# Patient Record
Sex: FEMALE | Race: WHITE | NOT HISPANIC OR LATINO | Employment: OTHER | URBAN - METROPOLITAN AREA
[De-identification: names, ages, dates, MRNs, and addresses within clinical notes are randomized per-mention and may not be internally consistent; named-entity substitution may affect disease eponyms.]

---

## 2023-12-29 PROBLEM — R00.1 SINUS BRADYCARDIA: Status: ACTIVE | Noted: 2023-12-29

## 2023-12-29 PROBLEM — N18.30 CHRONIC KIDNEY DISEASE, STAGE 3 (MULTI): Status: ACTIVE | Noted: 2023-12-29

## 2023-12-29 PROBLEM — I10 HYPERTENSION: Status: ACTIVE | Noted: 2023-12-29

## 2023-12-29 PROBLEM — E03.9 HYPOTHYROIDISM: Status: ACTIVE | Noted: 2023-12-29

## 2023-12-29 PROBLEM — I48.92 ATRIAL FLUTTER (MULTI): Status: ACTIVE | Noted: 2023-12-29

## 2023-12-29 PROBLEM — I47.19 PAT (PAROXYSMAL ATRIAL TACHYCARDIA) (CMS-HCC): Status: ACTIVE | Noted: 2023-12-29

## 2023-12-29 PROBLEM — E78.2 MIXED HYPERLIPIDEMIA: Status: ACTIVE | Noted: 2023-12-29

## 2023-12-29 PROBLEM — I49.3 PREMATURE VENTRICULAR CONTRACTIONS: Status: ACTIVE | Noted: 2023-12-29

## 2023-12-29 PROBLEM — I63.9 CEREBROVASCULAR ACCIDENT (CVA) (MULTI): Status: ACTIVE | Noted: 2023-12-29

## 2023-12-29 RX ORDER — LANOLIN ALCOHOL/MO/W.PET/CERES
400 CREAM (GRAM) TOPICAL DAILY
COMMUNITY

## 2023-12-29 RX ORDER — LOVASTATIN 40 MG/1
40 TABLET ORAL NIGHTLY
COMMUNITY
Start: 2021-11-16

## 2023-12-29 RX ORDER — CARVEDILOL 6.25 MG/1
6.25 TABLET ORAL
COMMUNITY
Start: 2022-08-14 | End: 2024-03-08 | Stop reason: WASHOUT

## 2023-12-29 RX ORDER — MULTIVITAMIN
1 TABLET ORAL DAILY
COMMUNITY

## 2023-12-29 RX ORDER — GABAPENTIN 300 MG/1
300 CAPSULE ORAL NIGHTLY
COMMUNITY
Start: 2021-11-16

## 2023-12-29 RX ORDER — DOCUSATE SODIUM 100 MG/1
100 CAPSULE, LIQUID FILLED ORAL 2 TIMES DAILY PRN
COMMUNITY

## 2023-12-29 RX ORDER — ASPIRIN 81 MG/1
81 TABLET ORAL DAILY
COMMUNITY

## 2023-12-29 RX ORDER — LEVOTHYROXINE SODIUM 25 UG/1
25 TABLET ORAL DAILY
COMMUNITY
Start: 2021-11-16

## 2023-12-29 RX ORDER — ALLOPURINOL 100 MG/1
1 TABLET ORAL 2 TIMES DAILY
COMMUNITY
Start: 2021-11-29

## 2023-12-29 RX ORDER — LOSARTAN POTASSIUM 100 MG/1
100 TABLET ORAL DAILY
COMMUNITY
Start: 2023-01-04

## 2023-12-29 RX ORDER — CHOLECALCIFEROL (VITAMIN D3) 25 MCG
1 TABLET ORAL DAILY
COMMUNITY

## 2023-12-29 RX ORDER — LUTEIN 6 MG
1 TABLET ORAL DAILY
COMMUNITY

## 2024-03-08 ENCOUNTER — OFFICE VISIT (OUTPATIENT)
Dept: CARDIOLOGY | Facility: CLINIC | Age: 89
End: 2024-03-08
Payer: MEDICARE

## 2024-03-08 VITALS
BODY MASS INDEX: 29.64 KG/M2 | HEART RATE: 63 BPM | WEIGHT: 151 LBS | SYSTOLIC BLOOD PRESSURE: 134 MMHG | DIASTOLIC BLOOD PRESSURE: 68 MMHG | HEIGHT: 60 IN

## 2024-03-08 DIAGNOSIS — I21.09: Primary | ICD-10-CM

## 2024-03-08 DIAGNOSIS — E78.2 MIXED HYPERLIPIDEMIA: ICD-10-CM

## 2024-03-08 DIAGNOSIS — I10 PRIMARY HYPERTENSION: ICD-10-CM

## 2024-03-08 DIAGNOSIS — I48.92 ATRIAL FLUTTER, UNSPECIFIED TYPE (MULTI): ICD-10-CM

## 2024-03-08 DIAGNOSIS — R00.1 SINUS BRADYCARDIA: ICD-10-CM

## 2024-03-08 DIAGNOSIS — R53.83 OTHER FATIGUE: ICD-10-CM

## 2024-03-08 DIAGNOSIS — I63.9 CEREBROVASCULAR ACCIDENT (CVA), UNSPECIFIED MECHANISM (MULTI): ICD-10-CM

## 2024-03-08 DIAGNOSIS — I47.19 PAT (PAROXYSMAL ATRIAL TACHYCARDIA) (CMS-HCC): ICD-10-CM

## 2024-03-08 DIAGNOSIS — Z78.9 NEVER SMOKED CIGARETTES: ICD-10-CM

## 2024-03-08 DIAGNOSIS — N18.30 STAGE 3 CHRONIC KIDNEY DISEASE, UNSPECIFIED WHETHER STAGE 3A OR 3B CKD (MULTI): ICD-10-CM

## 2024-03-08 DIAGNOSIS — Z71.89 ENCOUNTER TO DISCUSS TREATMENT OPTIONS: ICD-10-CM

## 2024-03-08 DIAGNOSIS — E03.9 HYPOTHYROIDISM, UNSPECIFIED TYPE: ICD-10-CM

## 2024-03-08 DIAGNOSIS — I49.3 PREMATURE VENTRICULAR CONTRACTIONS: ICD-10-CM

## 2024-03-08 DIAGNOSIS — Z71.89 ENCOUNTER FOR MEDICATION REVIEW AND COUNSELING: ICD-10-CM

## 2024-03-08 PROCEDURE — 1036F TOBACCO NON-USER: CPT | Performed by: INTERNAL MEDICINE

## 2024-03-08 PROCEDURE — 3075F SYST BP GE 130 - 139MM HG: CPT | Performed by: INTERNAL MEDICINE

## 2024-03-08 PROCEDURE — 3078F DIAST BP <80 MM HG: CPT | Performed by: INTERNAL MEDICINE

## 2024-03-08 PROCEDURE — 1159F MED LIST DOCD IN RCRD: CPT | Performed by: INTERNAL MEDICINE

## 2024-03-08 PROCEDURE — 99215 OFFICE O/P EST HI 40 MIN: CPT | Performed by: INTERNAL MEDICINE

## 2024-03-08 RX ORDER — HYDROCHLOROTHIAZIDE 12.5 MG/1
12.5 CAPSULE ORAL DAILY
COMMUNITY

## 2024-03-08 RX ORDER — NITROGLYCERIN 0.4 MG/1
0.4 TABLET SUBLINGUAL EVERY 5 MIN PRN
Qty: 20 TABLET | Refills: 2 | Status: SHIPPED | OUTPATIENT
Start: 2024-03-08 | End: 2024-05-22

## 2024-03-08 RX ORDER — CARVEDILOL 12.5 MG/1
12.5 TABLET ORAL
COMMUNITY

## 2024-03-08 RX ORDER — HYDRALAZINE HYDROCHLORIDE 25 MG/1
25 TABLET, FILM COATED ORAL 2 TIMES DAILY
COMMUNITY

## 2024-03-08 RX ORDER — ISOSORBIDE MONONITRATE 30 MG/1
30 TABLET, EXTENDED RELEASE ORAL DAILY
Qty: 90 TABLET | Refills: 3 | Status: SHIPPED | OUTPATIENT
Start: 2024-03-08 | End: 2025-03-08

## 2024-03-08 ASSESSMENT — ENCOUNTER SYMPTOMS
LIGHT-HEADEDNESS: 1
DYSPNEA ON EXERTION: 1
PALPITATIONS: 0

## 2024-03-08 NOTE — PROGRESS NOTES
Chief Complaint:   Follow-up (1 year)     History Of Present Illness:    Christina Kingsley is a 89 y.o. female presenting with follow-up.       She feels okay.  She denies any potation, lightheadedness, near-syncope, or syncope.    She has noticed when she is up  stairs she is pretty short of breath and has chest tightness.  We discussed stress testing and possible catheterization.  She reports that she wants conservative therapy with medications.      Last Recorded Vitals:  Vitals:    03/08/24 1037   BP: 134/68   BP Location: Left arm   Patient Position: Sitting   Pulse: 63   Weight: 68.5 kg (151 lb)   Height: 1.524 m (5')       Past Medical History:  See List    Past Surgical History:  See List       Social History:  She reports that she has never smoked. She has never used smokeless tobacco. She reports that she does not currently use alcohol. She reports that she does not use drugs.    Family History:  Family History   Problem Relation Name Age of Onset    Stroke Mother      Hypertension Mother      Cancer Mother      Stroke Father          Allergies:  Patient has no known allergies.    Outpatient Medications:  Current Outpatient Medications   Medication Instructions    allopurinol (Zyloprim) 100 mg tablet 1 tablet, oral, 2 times daily    aspirin 81 mg, oral, Daily    CALCIUM CARBONATE-VITAMIN D3 ORAL 1 tablet, oral, Daily    carvedilol (COREG) 12.5 mg, oral, 2 times daily with meals    cholecalciferol (Vitamin D-3) 25 MCG (1000 UT) tablet 1 tablet, oral, Daily    docusate sodium (COLACE) 100 mg, oral, 2 times daily PRN    gabapentin (NEURONTIN) 300 mg, oral, Nightly    hydrALAZINE (APRESOLINE) 25 mg, oral, 2 times daily    hydroCHLOROthiazide (MICROZIDE) 12.5 mg, oral, Daily    levothyroxine (SYNTHROID, LEVOXYL) 25 mcg, oral, Daily    losartan (COZAAR) 100 mg, oral, Daily    lovastatin (MEVACOR) 40 mg, oral, Nightly    magnesium oxide (MAG-OX) 400 mg, oral, Daily    multivitamin tablet 1 tablet, oral, Daily     vitamin E acid succinate (vitamin E succinate) 268 mg (400 unit) tablet 1 tablet, oral, Daily   Review of Systems   Cardiovascular:  Positive for dyspnea on exertion. Negative for chest pain and palpitations.   Neurological:  Positive for light-headedness.         Physical Exam:  Constitutional:       General: Awake.      Appearance: Normal and healthy appearance. Well-developed and not in distress.   Neck:      Vascular: No JVR. JVD normal.   Pulmonary:      Effort: Pulmonary effort is normal.      Breath sounds: Normal breath sounds. No wheezing. No rhonchi. No rales.   Chest:      Chest wall: Not tender to palpatation.   Cardiovascular:      PMI at left midclavicular line. Normal rate. Regular rhythm. Normal S1. Normal S2.       Murmurs: There is no murmur.      No gallop.  No click. No rub.   Pulses:     Intact distal pulses.   Edema:     Peripheral edema absent.   Abdominal:      Tenderness: There is no abdominal tenderness.   Musculoskeletal: Normal range of motion.         General: No tenderness. Skin:     General: Skin is warm and dry.   Neurological:      General: No focal deficit present.      Mental Status: Alert and oriented to person, place and time.            Last Labs:  CBC -  Lab Results   Component Value Date    WBC 7.2 02/09/2022    HGB 12.4 02/09/2022    HCT 35.3 (L) 02/09/2022     (H) 02/09/2022     02/09/2022       CMP -  Lab Results   Component Value Date    CALCIUM 9.4 02/09/2022    PHOS 4.6 02/09/2022    PROT 6.6 02/09/2022    ALBUMIN 3.7 02/09/2022    AST 18 02/09/2022    ALT 13 02/09/2022    ALKPHOS 57 02/09/2022    BILITOT 0.5 02/09/2022       LIPID PANEL -   Lab Results   Component Value Date    CHOL 136 02/09/2022    TRIG 90 02/09/2022    HDL 52.0 02/09/2022    CHHDL 2.6 02/09/2022    LDLF 66 02/09/2022    VLDL 18 02/09/2022       RENAL FUNCTION PANEL -   Lab Results   Component Value Date    GLUCOSE 89 02/09/2022     02/09/2022    K 3.9 02/09/2022      "02/09/2022    CO2 30 02/09/2022    ANIONGAP 11 02/09/2022    BUN 29 (H) 02/09/2022    CREATININE 1.16 (H) 02/09/2022    CALCIUM 9.4 02/09/2022    PHOS 4.6 02/09/2022    ALBUMIN 3.7 02/09/2022        Lab Results   Component Value Date    BNP 71 06/26/2019       Last Cardiology Tests:  ECG:  Today.  Normal sinus rhythm.  Normal axis.  QT interval 410 ms.  Previous anteroseptal infarct.    Lab review: I have personally reviewed the laboratory result(s) see above    Assessment/Plan   Diagnoses and all orders for this visit:  Atrial flutter, unspecified type (CMS/HCC)  PAT (paroxysmal atrial tachycardia)  Premature ventricular contractions  Sinus bradycardia  Cerebrovascular accident (CVA), unspecified mechanism (CMS/HCC)  Mixed hyperlipidemia  Hypothyroidism, unspecified type  Primary hypertension  Other fatigue  Stage 3 chronic kidney disease, unspecified whether stage 3a or 3b CKD (CMS/HCC)  Never smoked cigarettes  BMI 29.0-29.9,adult  Encounter for medication review and counseling  Encounter to discuss treatment options    Exertional dyspnea and angina.  Patient request conservative therapy.  Will start Ismo 30 mg daily.  Also add nitroglycerin sublingual as needed.  Prescriptions sent.  Declines any evaluation.  She reports that given her age, she does not want to have really \"anything done.\"  Orthostatic near syncope and dizziness. Chronic. Improved after adjustment of medications in the past.  Weight has been stable, she will take that medication after lunch to minimize dizziness.  Behavioral modification reinforced.  Frequent premature ventricular contractions Holter 2017. Chronic. Stable. Reviewed medications. Continue medications.  Discussed refills.  Normal left ventricular function echo 2017. Left atrial enlargement with left atrial size of 48 mm . Chronic.  History of sinus bradycardia. No symptoms with Holter monitor June 2022.  Chronic.  Stable.  Persistent atrial flutter and atrial tachycardia.  Declines " any further intervention.  Valvular heart disease with mild-to-moderate mitral regurgitation, moderate-to-severe tricuspid regurgitation, echo 2017.  Declines repeat evaluation.  Pulmonary hypertension with RVSP of 48 mmHg . Chronic. Stable. Declining continues to decline any further evaluation given her age.  Hyperlipidemia. Reviewed medications.  Hypertension, controlled, benign, chronic. Reviewed medications. Continue medications. Refill discussed  Chronic kidney disease, stage 3. Stable. Follows with renal.  Overweight        AHA recommendations for exercise, diet, and behavioral modification reviewed with pt.     The patient and I discussed the mechanism of arrhythmia, ECG, angina, shortness of breath, blood pressure, last blood work, ECG, heart rate, serial treatment as per her wishes, no intervention, no cardiac testing, discussion if and what medication refills needed, treatment options, risks, benefits, and imponderables. American Heart Association lifestyle changes and behavioral modification discussed. All questions answered in detail. Counseling over 50% visit regarding above. Patient appreciative of care.       Jeanine Proctor MD

## 2024-03-08 NOTE — PATIENT INSTRUCTIONS
Continue same medications/treatment.  Patient educated on proper medication use.  Patient educated on risk factor modification.  Please bring any lab results from other providers/physicians to your next appointment.    Please bring all medicines, vitamins, and herbal supplements with you when you come to the office.    Prescriptions will not be filled unless you are compliant with your follow up appointments or have a follow up appointment scheduled as per instruction of your physician. Refills should be requested at the time of your visit.    START isosorbide mononitrate 30mg daily  START nitroglycerin tablet as needed for chest pain every 5 min  Follow up with Bernarda in 6 months    IGOR HUYNH RN, AM SCRIBING FOR AND IN THE PRESENCE OF DR. LOUIE KERR MD, FACC, FACP, FHRS

## 2024-05-22 DIAGNOSIS — I21.09: ICD-10-CM

## 2024-05-22 RX ORDER — NITROGLYCERIN 0.4 MG/1
TABLET SUBLINGUAL
Qty: 25 TABLET | Refills: 2 | Status: SHIPPED | OUTPATIENT
Start: 2024-05-22

## 2024-09-09 ENCOUNTER — APPOINTMENT (OUTPATIENT)
Dept: CARDIOLOGY | Facility: CLINIC | Age: 89
End: 2024-09-09
Payer: MEDICARE

## 2024-09-09 VITALS
HEART RATE: 50 BPM | SYSTOLIC BLOOD PRESSURE: 106 MMHG | WEIGHT: 151 LBS | DIASTOLIC BLOOD PRESSURE: 60 MMHG | HEIGHT: 60 IN | BODY MASS INDEX: 29.64 KG/M2

## 2024-09-09 DIAGNOSIS — I10 PRIMARY HYPERTENSION: ICD-10-CM

## 2024-09-09 DIAGNOSIS — I49.3 PREMATURE VENTRICULAR CONTRACTIONS: Primary | ICD-10-CM

## 2024-09-09 DIAGNOSIS — E78.2 MIXED HYPERLIPIDEMIA: ICD-10-CM

## 2024-09-09 DIAGNOSIS — I47.19 PAT (PAROXYSMAL ATRIAL TACHYCARDIA) (CMS-HCC): ICD-10-CM

## 2024-09-09 DIAGNOSIS — I63.9 CEREBROVASCULAR ACCIDENT (CVA), UNSPECIFIED MECHANISM (MULTI): ICD-10-CM

## 2024-09-09 DIAGNOSIS — E03.9 HYPOTHYROIDISM, UNSPECIFIED TYPE: ICD-10-CM

## 2024-09-09 DIAGNOSIS — N18.30 STAGE 3 CHRONIC KIDNEY DISEASE, UNSPECIFIED WHETHER STAGE 3A OR 3B CKD (MULTI): ICD-10-CM

## 2024-09-09 DIAGNOSIS — I48.92 ATRIAL FLUTTER, UNSPECIFIED TYPE (MULTI): ICD-10-CM

## 2024-09-09 DIAGNOSIS — Z78.9 NEVER SMOKED CIGARETTES: ICD-10-CM

## 2024-09-09 DIAGNOSIS — R53.83 OTHER FATIGUE: ICD-10-CM

## 2024-09-09 DIAGNOSIS — R00.1 SINUS BRADYCARDIA: ICD-10-CM

## 2024-09-09 PROCEDURE — 1036F TOBACCO NON-USER: CPT | Performed by: NURSE PRACTITIONER

## 2024-09-09 PROCEDURE — 3078F DIAST BP <80 MM HG: CPT | Performed by: NURSE PRACTITIONER

## 2024-09-09 PROCEDURE — 1159F MED LIST DOCD IN RCRD: CPT | Performed by: NURSE PRACTITIONER

## 2024-09-09 PROCEDURE — 99213 OFFICE O/P EST LOW 20 MIN: CPT | Performed by: NURSE PRACTITIONER

## 2024-09-09 PROCEDURE — 93000 ELECTROCARDIOGRAM COMPLETE: CPT | Performed by: INTERNAL MEDICINE

## 2024-09-09 PROCEDURE — 3074F SYST BP LT 130 MM HG: CPT | Performed by: NURSE PRACTITIONER

## 2024-09-09 NOTE — PROGRESS NOTES
"CARDIOLOGY OFFICE VISIT      CHIEF COMPLAINT  Chief Complaint   Patient presents with    Follow-up     6 months   Chief complaint: \"I do get short of breath with walking.\"    HISTORY OF PRESENT ILLNESS  HPI  History: The patient is a 89-year-old  female who is followed for valvular heart disease consisting of mild to moderate MR, moderate to severe TR, palpitations, orthostatic dizziness and lightheadedness, hypertension, remote CVA and paroxysmal atrial flutter and atrial tachycardia controlled on carvedilol and not anticoagulated.  She states that she does experience some shortness of breath with walking and reports that she is not as dizzy as she usually is.  She also experiences some mild lower extremity swelling which is improved with elevating the extremities.  She denies chest pain, palpitations, abdominal distention, or orthopnea.  Past Medical History  Past Medical History:   Diagnosis Date    Chronic kidney disease, stage 5 (Multi) 11/16/2021    Chronic kidney disease, stage 5    Overweight 11/16/2021    Overweight    Unspecified fall, initial encounter 11/16/2021    Fall at home       Social History  Social History     Tobacco Use    Smoking status: Never    Smokeless tobacco: Never   Substance Use Topics    Alcohol use: Not Currently    Drug use: Never       Family History     Family History   Problem Relation Name Age of Onset    Stroke Mother      Hypertension Mother      Cancer Mother      Stroke Father          Allergies:  No Known Allergies     Outpatient Medications:  Current Outpatient Medications   Medication Instructions    allopurinol (Zyloprim) 100 mg tablet 1 tablet, oral, 2 times daily    aspirin 81 mg, oral, Daily    CALCIUM CARBONATE-VITAMIN D3 ORAL 1 tablet, oral, Daily    carvedilol (COREG) 12.5 mg, oral, 2 times daily (morning and late afternoon)    cholecalciferol (Vitamin D-3) 25 MCG (1000 UT) tablet 1 tablet, oral, Daily    docusate sodium (COLACE) 100 mg, oral, 2 times " daily PRN    gabapentin (NEURONTIN) 300 mg, oral, Nightly    hydrALAZINE (APRESOLINE) 25 mg, oral, 2 times daily    hydroCHLOROthiazide (MICROZIDE) 12.5 mg, oral, Daily    isosorbide mononitrate ER (IMDUR) 30 mg, oral, Daily, Do not crush or chew.    levothyroxine (SYNTHROID, LEVOXYL) 25 mcg, oral, Daily    losartan (COZAAR) 100 mg, oral, Daily    lovastatin (MEVACOR) 40 mg, oral, Nightly    magnesium oxide (MAG-OX) 400 mg, oral, Daily    multivitamin tablet 1 tablet, oral, Daily    nitroglycerin (Nitrostat) 0.4 mg SL tablet PLACE 1 TABLET UNDER TONGUE EVERY 5 MINUTES IF NEEDED FOR CHEST PAIN. MAY REPEAT DOSE EVERY 5 MINUTES FOR UP TO 3 DOSES    vitamin E acid succinate (vitamin E succinate) 268 mg (400 unit) tablet 1 tablet, oral, Daily          REVIEW OF SYSTEMS  Review of Systems   All other systems reviewed and are negative.        VITALS  Vitals:    09/09/24 1019   Pulse: 50       PHYSICAL EXAM  Vitals and nursing note reviewed.   Constitutional:       Appearance: Normal appearance.   HENT:      Head: Normocephalic.   Neck:      Vascular: No JVD. Carotid upstrokes II/IV.  Cardiovascular:      Rate and Rhythm: Normal rate and regular rhythm.      Pulses: Normal pulses.      Heart sounds: Normal S1 S2, no S3 S4.  Grade 2/6 systolic murmur is noted across precordium.    Pulmonary:      Effort: Pulmonary effort is normal. Respirations regular and nonlabored.     Breath sounds: Clear to auscultation posterior laterally.  Abdominal:      General: Bowel sounds are normal.      Palpations: Abdomen is soft.   Musculoskeletal:         General: Normal range of motion.      Cervical back: Normal range of motion.   Skin:     General: Skin is warm and dry.   Neurological:      General: No focal deficit present.      Mental Status: Alert and oriented to person, place, and time.      Motor: Motor function is intact.   Psychiatric:         Attention and Perception: Attention and perception normal.         Mood and Affect: Mood  and affect normal.         Speech: Speech normal.         Behavior: Behavior normal. Behavior is cooperative.         Thought Content: Thought content normal.         Cognition and Memory: Cognition and memory normal.     Labs and testing: Twelve-lead EKG reveals sinus bradycardia 50 bpm.  QRS duration 72 ms,  ms, QTc 373 ms.  No acute ischemic changes are noted.      ASSESSMENT AND PLAN    Clinical impressions:  1. Orthostatic dizziness and lightheadedness.  2. 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  3. Normal left ventricular function per 2D echocardiogram dated September 22, 2017.  4. Left atrial enlargement with left atrial size of 48 mm per 2D echocardiogram.  5. Valvular heart disease consisting of mild to moderate mitral regurgitation, moderate to severe tricuspid regurgitation per 2D echocardiogram.  6. Pulmonary hypertension with right ventricular systolic pressure 48 mmHg per 2D echocardiogram.  7. Hypertension, controlled with a blood pressure today 106/60.  8. Chronic kidney disease, stage III.  9. Class I obesity with a BMI of 29.49.     Recommendations:  1.  Continue current medications as prescribed.  2.  Follow-up in office with Dr. Proctor in 6 months or sooner if needed.  3.  Follow-up in office with Dr. Spencer as scheduled for stage III chronic kidney disease.  4.  Continue lifestyle modifications as discussed.    Evaluation and note by Bernarda Barajas CNP  **Please excuse any errors in grammar or translation related to this dictation.  Voice recognition software was utilized to prepare this document.**

## 2025-01-07 DIAGNOSIS — I21.09: ICD-10-CM

## 2025-01-07 RX ORDER — ISOSORBIDE MONONITRATE 30 MG/1
30 TABLET, EXTENDED RELEASE ORAL DAILY
Qty: 90 TABLET | Refills: 3 | Status: SHIPPED | OUTPATIENT
Start: 2025-01-07

## 2025-01-07 NOTE — TELEPHONE ENCOUNTER
Received request for prescription refills for patient.   Patient follows with Dr Proctor    Request is for Isosorbide  Is patient currently on medication Y    Last OV 9/9/24  Next OV 3/25/25    Pended for signing and sent to provider

## 2025-03-03 ENCOUNTER — TELEPHONE (OUTPATIENT)
Dept: CARDIOLOGY | Facility: CLINIC | Age: OVER 89
End: 2025-03-03
Payer: MEDICARE

## 2025-03-03 NOTE — TELEPHONE ENCOUNTER
Spoke to patient regarding rescheduling appointment with Beverly on 03/25 due to a schedule change. We moved the appointment to be on 04/08 at 1030am.

## 2025-03-25 ENCOUNTER — APPOINTMENT (OUTPATIENT)
Dept: CARDIOLOGY | Facility: CLINIC | Age: OVER 89
End: 2025-03-25
Payer: MEDICARE

## 2025-04-07 NOTE — PROGRESS NOTES
Electrophysiology Office Visit     CHIEF COMPLAINT  Chief Complaint   Patient presents with    Follow-up     6 month      Primary EP doctor: Dr Proctor  Primary Cardiologist:    EP History: Orthostatic dizziness and lightheadedness (behavioral modifications), PVCs, mild-mod MR/mod-severe TR/mod pulm HTN (9/2016 echo), HTN, and remote CVA, CKD 3b, hypothyroidism.  5/2017 EP OV new patient for presyncope. Abnl KOH monitor in Feb 2017; Sinus rhythm, 28 to 94 beats per minute, frequent PACs, 2.66 pauses. Metoprolol was discontinued.  A 24-hour Holter monitor in April 2017 noted, sinus rhythm, heart rate is 48 to 122 beats per minute, one brief episode of atrial tachycardia x4 beats.  11/2017 EP OV, pt c/o orthostatic dizziness. Oct 2017 Holter noted frequent PVCs at 10%, nsVT x 4 beats buy symptoms of dizziness did not correlate with randal or tachy episodes. Recommend increase fluids and caution with positional changes.  Pt would like conservative approach.   6/2018 Started MgOx for PVCs  3/8/2024 EP OV, pt c/o exertional dyspnea and angina but wants conservative tx and no interventions at her age. Imdur 30mg prescribed.   Carvedilol 12.5mg BID, MgOx 400mg QD. Losartan 100mg, Imdur 30mg QD, HCTZ 12.5mg QD, hydralazine 25mg BID    HPI: 4/8/2025  89 year old female pmhx Orthostatic dizziness and lightheadedness (behavioral modifications), PVCs, mild-mod MR/mod-severe TR/mod pulm HTN (9/2016 echo), HTN, and remote CVA, CKD 3b. Here for 6 month follow up.  She resides at the assisted living [facility]. She uses a motorized wheelchair and walker to get around. Since Dr Proctor prescribed Imur, she thinks this helps her light headedness and dizziness. She feels stable with her shortness of breath as she can still do the minimal activity that is required to keep her happy. Never used nitrogylcerin SL. No other concerns.     Today's EKG Interpretation: NSR, rate 72bpm, 1st AVB , OH 206ms, QRS 86ms, Qtc 420ms    VITALS  Vitals:     04/08/25 1024   BP: 132/70   Pulse: 72     Wt Readings from Last 4 Encounters:   04/08/25 65.8 kg (145 lb)   09/09/24 68.5 kg (151 lb)   03/08/24 68.5 kg (151 lb)   03/07/23 69.4 kg (153 lb)     PHYSICAL EXAM:  GENERAL:  Well developed, well nourished, in no acute distress in motorized wheelchair  NEURO/PSYCH:  No focal deficits. A&O x 3   LUNGS:  Clear to auscultation bilaterally. No wheezing, rhonci, or crackles  HEART:  RRR, no M/R/G.   EXTREMITIES:  Warm with good color, no clubbing or cyanosis.  No pitting edema.     Past Medical History:   Diagnosis Date    Chronic kidney disease, stage 5 (Multi) 11/16/2021    Chronic kidney disease, stage 5    Overweight 11/16/2021    Overweight    Unspecified fall, initial encounter 11/16/2021    Fall at home     Past Surgical History:   Procedure Laterality Date    MR HEAD ANGIO WO IV CONTRAST  2/27/2012    MR HEAD ANGIO WO IV CONTRAST [facility]    MR NECK ANGIO WO IV CONTRAST  2/27/2012    MR NECK ANGIO WO IV CONTRAST [facility]    OTHER SURGICAL HISTORY  11/16/2021    Cholecystectomy    OTHER SURGICAL HISTORY  11/16/2021    Cataract surgery    OTHER SURGICAL HISTORY  11/16/2021    Dilation and curettage    OTHER SURGICAL HISTORY  11/16/2021    Eye surgery    OTHER SURGICAL HISTORY  11/16/2021    Wrist surgery    OTHER SURGICAL HISTORY  11/16/2021    Tonsillectomy    OTHER SURGICAL HISTORY  11/16/2021    Complete colonoscopy     Social History     Tobacco Use    Smoking status: Never    Smokeless tobacco: Never   Substance Use Topics    Alcohol use: Not Currently    Drug use: Never     Family History   Problem Relation Name Age of Onset    Stroke Mother      Hypertension Mother      Cancer Mother      Stroke Father       No Known Allergies   Current Outpatient Medications   Medication Instructions    allopurinol (Zyloprim) 100 mg tablet 1 tablet, 2 times daily    aspirin 81 mg, Daily    CALCIUM CARBONATE-VITAMIN D3 ORAL 1 tablet, Daily    carvedilol  (COREG) 12.5 mg, 2 times daily (morning and late afternoon)    cholecalciferol (Vitamin D-3) 25 MCG (1000 UT) tablet 1 tablet, Daily    docusate sodium (COLACE) 100 mg, 2 times daily PRN    gabapentin (NEURONTIN) 300 mg, Nightly    hydrALAZINE (APRESOLINE) 25 mg, 2 times daily    hydroCHLOROthiazide (MICROZIDE) 12.5 mg, Daily    isosorbide mononitrate ER (IMDUR) 30 mg, oral, Daily, Do not crush or chew    levothyroxine (SYNTHROID, LEVOXYL) 25 mcg, Daily    losartan (COZAAR) 100 mg, Daily    lovastatin (MEVACOR) 40 mg, Nightly    magnesium oxide (MAG-OX) 400 mg, Daily    multivitamin tablet 1 tablet, Daily    nitroglycerin (Nitrostat) 0.4 mg SL tablet PLACE 1 TABLET UNDER TONGUE EVERY 5 MINUTES IF NEEDED FOR CHEST PAIN. MAY REPEAT DOSE EVERY 5 MINUTES FOR UP TO 3 DOSES      Relevant reports:   9/22/2016 ECHO  1. Normal left ventricular systolic function with no regional wall motion abnormalities.   2. Moderate right ventricular enlargement with preserved right ventricular systolic function.  3. Moderate-to-severe tricuspid regurgitation with at least moderate pulmonary hypertension.   Mild-to-moderate mitral regurgitation with no mitral stenosis. The mitral inflow pattern is consistent with grade 1 diastolic dysfunction  4. There are no previous studies for comparison    2/27/2012 ECHO  IMPRESSION:  EF, 55-60%. Mild concentric left ventricular hypertrophy. No WMA.  The left ventricular chamber size, wall thickness and systolic function are within normal limits.   The left atrium is mildly dilated.  The right ventricular chamber size and systolic function are within normal limits.  The right atrium appears normal.  No AR   Mild MR. The mitral regurgitant jet is eccentric.  Mild TR   Borderline pulmonary hypertension.  There is no pericardial effusion.  NO prior studies available to compare with.    12/9/2016 PFT: Suggestive of moderate restrictive lung disease w/ air trapping and decreased in diffusion, secondary to  ventilation/perfusion mismatching, secondary to lung disease. No improvement with bronchodilator should not preclude bronchodilator use if clinically indicated.      ASSESSMENT AND PLAN  Problem List Items Addressed This Visit       Chronic kidney disease, stage 3 (Multi)    Hypothyroidism    Premature ventricular contractions - Primary  None seen on today's EKG    PVCs    Relevant Orders    ECG 12 lead (Clinic Performed)    Hypertension  Controlled    Orthostatic dizziness  Feels stable on Imdur and is glad that Dr Proctor prescribed that.   Follow up with Dr Proctor in 6 months    Pulmonary hypertension (Multi) and TR  Moderate-to-severe tricuspid regurgitation with at least moderate pulmonary hypertension, per 2016 echo  Shortness of Breath   She states she has a hiatal hernia. I told her her SOB may be due to her heart valves and pulm HTN. She feels stable with her shortness of breath as she can still do the minimal activity that is required to keep her happy.         Beverly Light, [facility], PA-C

## 2025-04-08 ENCOUNTER — APPOINTMENT (OUTPATIENT)
Dept: CARDIOLOGY | Facility: CLINIC | Age: OVER 89
End: 2025-04-08
Payer: MEDICARE

## 2025-04-08 VITALS
DIASTOLIC BLOOD PRESSURE: 70 MMHG | SYSTOLIC BLOOD PRESSURE: 132 MMHG | BODY MASS INDEX: 28.47 KG/M2 | WEIGHT: 145 LBS | HEIGHT: 60 IN | HEART RATE: 72 BPM

## 2025-04-08 DIAGNOSIS — R42 ORTHOSTATIC DIZZINESS: ICD-10-CM

## 2025-04-08 DIAGNOSIS — I27.20 PULMONARY HYPERTENSION (MULTI): ICD-10-CM

## 2025-04-08 DIAGNOSIS — I49.3 PREMATURE VENTRICULAR CONTRACTIONS: Primary | ICD-10-CM

## 2025-04-08 DIAGNOSIS — E03.9 HYPOTHYROIDISM, UNSPECIFIED TYPE: ICD-10-CM

## 2025-04-08 DIAGNOSIS — I10 PRIMARY HYPERTENSION: ICD-10-CM

## 2025-04-08 DIAGNOSIS — I48.92 ATRIAL FLUTTER, UNSPECIFIED TYPE (MULTI): ICD-10-CM

## 2025-04-08 DIAGNOSIS — N18.30 STAGE 3 CHRONIC KIDNEY DISEASE, UNSPECIFIED WHETHER STAGE 3A OR 3B CKD (MULTI): ICD-10-CM

## 2025-04-08 PROCEDURE — 1036F TOBACCO NON-USER: CPT | Performed by: PHYSICIAN ASSISTANT

## 2025-04-08 PROCEDURE — 1159F MED LIST DOCD IN RCRD: CPT | Performed by: PHYSICIAN ASSISTANT

## 2025-04-08 PROCEDURE — 93000 ELECTROCARDIOGRAM COMPLETE: CPT | Performed by: INTERNAL MEDICINE

## 2025-04-08 PROCEDURE — 3078F DIAST BP <80 MM HG: CPT | Performed by: PHYSICIAN ASSISTANT

## 2025-04-08 PROCEDURE — 99213 OFFICE O/P EST LOW 20 MIN: CPT | Performed by: PHYSICIAN ASSISTANT

## 2025-04-08 PROCEDURE — 3075F SYST BP GE 130 - 139MM HG: CPT | Performed by: PHYSICIAN ASSISTANT

## 2025-04-08 NOTE — PATIENT INSTRUCTIONS
Great to see you today.   Please take your medications and or do life style behavior modifications as discussed.   Please make appointment in 6 months with Dr Proctor  Please call if you have any questions or concerns.  Please go to emergency department if you have abrupt onset of chest, shortness of breath, light headedness or dizziness.

## 2025-06-12 ENCOUNTER — HOSPITAL ENCOUNTER (OUTPATIENT)
Age: 89
Setting detail: SPECIMEN
Discharge: HOME | End: 2025-06-12

## 2025-08-26 ENCOUNTER — APPOINTMENT (OUTPATIENT)
Dept: CARDIOLOGY | Facility: CLINIC | Age: OVER 89
End: 2025-08-26
Payer: MEDICARE

## 2025-09-05 ENCOUNTER — APPOINTMENT (OUTPATIENT)
Dept: CARDIOLOGY | Facility: CLINIC | Age: OVER 89
End: 2025-09-05
Payer: MEDICARE

## 2025-09-05 ENCOUNTER — TELEPHONE (OUTPATIENT)
Dept: CARDIOLOGY | Facility: CLINIC | Age: OVER 89
End: 2025-09-05

## 2025-09-05 PROBLEM — I21.09: Status: ACTIVE | Noted: 2025-09-05

## 2025-09-05 PROBLEM — R68.89: Status: ACTIVE | Noted: 2025-09-05

## 2025-09-05 PROBLEM — Z71.1 CONCERN ABOUT END OF LIFE: Status: ACTIVE | Noted: 2025-09-05

## 2025-09-05 ASSESSMENT — ENCOUNTER SYMPTOMS
DYSPNEA ON EXERTION: 0
PALPITATIONS: 0

## 2025-10-21 ENCOUNTER — APPOINTMENT (OUTPATIENT)
Dept: CARDIOLOGY | Facility: CLINIC | Age: OVER 89
End: 2025-10-21
Payer: MEDICARE

## 2025-10-23 ENCOUNTER — APPOINTMENT (OUTPATIENT)
Dept: CARDIOLOGY | Facility: CLINIC | Age: OVER 89
End: 2025-10-23
Payer: MEDICARE